# Patient Record
Sex: FEMALE | Race: BLACK OR AFRICAN AMERICAN | Employment: FULL TIME | URBAN - METROPOLITAN AREA
[De-identification: names, ages, dates, MRNs, and addresses within clinical notes are randomized per-mention and may not be internally consistent; named-entity substitution may affect disease eponyms.]

---

## 2022-05-06 ENCOUNTER — OFFICE VISIT (OUTPATIENT)
Dept: PRIMARY CARE CLINIC | Age: 32
End: 2022-05-06
Payer: COMMERCIAL

## 2022-05-06 ENCOUNTER — TELEPHONE (OUTPATIENT)
Dept: SURGERY | Age: 32
End: 2022-05-06

## 2022-05-06 VITALS
HEART RATE: 79 BPM | OXYGEN SATURATION: 97 % | DIASTOLIC BLOOD PRESSURE: 82 MMHG | WEIGHT: 149.4 LBS | HEIGHT: 68 IN | TEMPERATURE: 97.7 F | SYSTOLIC BLOOD PRESSURE: 123 MMHG | BODY MASS INDEX: 22.64 KG/M2

## 2022-05-06 DIAGNOSIS — L72.3 SEBACEOUS CYST: Primary | ICD-10-CM

## 2022-05-06 DIAGNOSIS — F43.21 GRIEF ASSOCIATED WITH LOSS OF FETUS: ICD-10-CM

## 2022-05-06 PROCEDURE — 99203 OFFICE O/P NEW LOW 30 MIN: CPT | Performed by: NURSE PRACTITIONER

## 2022-05-06 RX ORDER — DROSPIRENONE, ETHINYL ESTRADIOL AND LEVOMEFOLATE CALCIUM AND LEVOMEFOLATE CALCIUM 3-0.02(24)
KIT ORAL
COMMUNITY
Start: 2022-04-27

## 2022-05-06 RX ORDER — MULTIVITAMIN
TABLET ORAL
COMMUNITY

## 2022-05-06 SDOH — ECONOMIC STABILITY: FOOD INSECURITY: WITHIN THE PAST 12 MONTHS, YOU WORRIED THAT YOUR FOOD WOULD RUN OUT BEFORE YOU GOT MONEY TO BUY MORE.: NEVER TRUE

## 2022-05-06 SDOH — ECONOMIC STABILITY: FOOD INSECURITY: WITHIN THE PAST 12 MONTHS, THE FOOD YOU BOUGHT JUST DIDN'T LAST AND YOU DIDN'T HAVE MONEY TO GET MORE.: NEVER TRUE

## 2022-05-06 ASSESSMENT — PATIENT HEALTH QUESTIONNAIRE - PHQ9
SUM OF ALL RESPONSES TO PHQ9 QUESTIONS 1 & 2: 3
SUM OF ALL RESPONSES TO PHQ QUESTIONS 1-9: 5
3. TROUBLE FALLING OR STAYING ASLEEP: 1
4. FEELING TIRED OR HAVING LITTLE ENERGY: 1
SUM OF ALL RESPONSES TO PHQ QUESTIONS 1-9: 5
8. MOVING OR SPEAKING SO SLOWLY THAT OTHER PEOPLE COULD HAVE NOTICED. OR THE OPPOSITE, BEING SO FIGETY OR RESTLESS THAT YOU HAVE BEEN MOVING AROUND A LOT MORE THAN USUAL: 0
7. TROUBLE CONCENTRATING ON THINGS, SUCH AS READING THE NEWSPAPER OR WATCHING TELEVISION: 0
5. POOR APPETITE OR OVEREATING: 0
10. IF YOU CHECKED OFF ANY PROBLEMS, HOW DIFFICULT HAVE THESE PROBLEMS MADE IT FOR YOU TO DO YOUR WORK, TAKE CARE OF THINGS AT HOME, OR GET ALONG WITH OTHER PEOPLE: 0
SUM OF ALL RESPONSES TO PHQ QUESTIONS 1-9: 5
9. THOUGHTS THAT YOU WOULD BE BETTER OFF DEAD, OR OF HURTING YOURSELF: 0
2. FEELING DOWN, DEPRESSED OR HOPELESS: 2
6. FEELING BAD ABOUT YOURSELF - OR THAT YOU ARE A FAILURE OR HAVE LET YOURSELF OR YOUR FAMILY DOWN: 0
1. LITTLE INTEREST OR PLEASURE IN DOING THINGS: 1
SUM OF ALL RESPONSES TO PHQ QUESTIONS 1-9: 5

## 2022-05-06 ASSESSMENT — SOCIAL DETERMINANTS OF HEALTH (SDOH): HOW HARD IS IT FOR YOU TO PAY FOR THE VERY BASICS LIKE FOOD, HOUSING, MEDICAL CARE, AND HEATING?: NOT HARD AT ALL

## 2022-05-06 NOTE — PROGRESS NOTES
[facility]  [id number] [facility] [address]  Dept: [phone]  Dept Fax: [phone]    Patient Care Team:  MINESH Angeles CNP as PCP - General (Family Medicine)    2022    Tangela García (:  1990) raman 33 y.o. female, here for evaluation of the following medical concerns:   Chief Complaint   Patient presents with    New Patient     Est.Care    Mass     RL back, onset \"many years         Tangela García is here to establish care. The patient denies any pertinent medical history    She follows currently with [address] [facility]ne[facility] for her women's health needs. Itzel Miller is asking for assistance with 2 things today. Concerned about a growth on her right lower back. States she has had a lump that is been present for a few years, and lately it has been growing. It sits near her waistband is often irritated or has pressure applied to the area. It occasionally becomes inflamed. She is also asking for options for treatment for anxiety and depression. Itzel Miller admits that she had a tubal pregnancy last year, and this past January she had to make the decision to have an  due to hyperemesis. She voices that she had to make the decision between the pregnancy and her own health, and has been struggling with feeling down and depressed. She is often tearful with no known trigger. Itzel Miller admits to often wondering \"what if\" and admits her sleep has been restless. Review of Systems   Constitutional: Negative for chills and fever. Skin: Negative for rash and wound. Psychiatric/Behavioral: Positive for dysphoric mood and sleep disturbance. Negative for decreased concentration, self-injury and suicidal ideas. The patient is nervous/anxious. Prior to Visit Medications    Medication Sig Taking?  Authorizing Provider   Drospiren-Eth Estrad-Levomefol 3-0.02-0.451 MG TABS  Yes Historical Provider, MD   Multiple Vitamin (MULTI-VITAMIN DAILY) TABS Take by mouth Yes Historical Provider, MD        No Known Allergies    History reviewed. No pertinent past medical history. Past Surgical History:   Procedure Laterality Date    BREAST SURGERY       SECTION         Social History     Socioeconomic History    Marital status: Unknown     Spouse name: Not on file    Number of children: Not on file    Years of education: Not on file    Highest education level: Not on file   Occupational History    Not on file   Tobacco Use    Smoking status: Never Smoker    Smokeless tobacco: Never Used   Vaping Use    Vaping Use: Never used   Substance and Sexual Activity    Alcohol use: Yes     Comment: occ    Drug use: Never    Sexual activity: Yes   Other Topics Concern    Not on file   Social History Narrative    Not on file     Social Determinants of Health     Financial Resource Strain: Low Risk     Difficulty of Paying Living Expenses: Not hard at all   Food Insecurity: No Food Insecurity    Worried About Running Out of Food in the Last Year: Never true    [address] in the Last Year: Never true   Transportation Needs:     Lack of Transportation (Medical): Not on file    Lack of Transportation (Non-Medical):  Not on file   Physical Activity:     Days of Exercise per Week: Not on file    Minutes of Exercise per Session: Not on file   Stress:     Feeling of Stress : Not on file   Social Connections:     Frequency of Communication with Friends and Family: Not on file    Frequency of Social Gatherings with Friends and Family: Not on file    Attends Restorationist Services: Not on file    Active Member of Clubs or Organizations: Not on file    Attends Club or Organization Meetings: Not on file    Marital Status: Not on file   Intimate Partner Violence:     Fear of Current or Ex-Partner: Not on file    Emotionally Abused: Not on file    Physically Abused: Not on file   [employer] Sexually Abused: Not on file   Housing Stability:     Unable to Pay for Housing in the Last Year: Not on file    Number of Places Lived in the Last Year: Not on file    Unstable Housing in the Last Year: Not on file        Family History   Family history unknown: Yes       Vitals:    05/06/22 1000 05/06/22 1003   BP: (!) 140/90 123/82   Site: Right Upper Arm Left Upper Arm   Position: Sitting Sitting   Pulse: 79 79   Temp: 97.7 °F (36.5 °C)    TempSrc: Temporal    SpO2: 97%    Weight: 149 lb 6.4 oz (67.8 kg)    Height: 5' 8\" (1.727 m)      Estimated body mass index is 22.72 kg/m² as calculated from the following:    Height as of this encounter: 5' 8\" (1.727 m). Weight as of this encounter: 149 lb 6.4 oz (67.8 kg). Physical Exam  Vitals and nursing note reviewed. Constitutional:       General: She is not in acute distress. Appearance: She is well-developed. HENT:      Head: Normocephalic. Eyes:      General: No scleral icterus. Pupils: Pupils are equal, round, and reactive to light. Cardiovascular:      Rate and Rhythm: Normal rate and regular rhythm. Pulmonary:      Effort: Pulmonary effort is normal.      Breath sounds: Normal breath sounds. Abdominal:      Palpations: Abdomen is soft. Musculoskeletal:      Cervical back: Normal range of motion and neck supple. Skin:     General: Skin is warm and dry. Findings: No abscess, signs of injury or wound. Neurological:      Mental Status: She is alert and oriented to person, place, and time. Coordination: Coordination normal.   Psychiatric:         Behavior: Behavior normal. Behavior is cooperative. Thought Content: Thought content normal.         Judgment: Judgment normal.         ASSESSMENT     Diagnosis Orders   1. Sebaceous cyst  Dave Law DO, General Surgery, [place]   2.  Grief associated with loss of fetus  [address]:  No orders of the defined types were placed in this encounter. 1. Sebaceous cyst versus blackhead. Due to location and growing size, patient does wish for it to be removed as it is often rubbed and irritated. General surgery referral placed today  2. Discussed treatment options for anxiety and depression. Patient does not wish to start medication at this time. Agreed to referral to trauma counseling and recovery. Close follow-up in 2 months    FOLLOW UP AND INSTRUCTIONS:  Return in about 2 months (around 7/6/2022) for Depression, Anxiety. · Discussed use, benefit, and side effects of prescribed medications. Barriers to  medication compliance addressed. All patient questions answered. Pt voiced  understanding. · Patient given educational materials - see patient instructions    · Patient advised to contact scheduling offices for any referrals or imaging orders  placed today if they have not been contacted in 48 hours. Return in about 2 months (around 7/6/2022) for Depression, Anxiety. An  electronic signature was used to authenticate this note. --MINESH Elizalde CNP on 5/6/2022 at 10:56 AM  Visit Information    Have you changed or started any medications since your last visit including any over-the-counter medicines, vitamins, or herbal medicines? no   Are you having any side effects from any of your medications? -  no  Have you stopped taking any of your medications? Is so, why? -  no    Have you seen any other physician or provider since your last visit? Yes - Records Obtained  Have you had any other diagnostic tests since your last visit? No  Have you been seen in the emergency room and/or had an admission to a hospital since we last saw you? No  Have you had your routine dental cleaning in the past 6 months? no    Have you activated your [employer] account? If not, what are your barriers?  No: Declined     Patient Care Team:  MINESH Elizalde CNP as PCP - General (Family Medicine)    Medical History Review  Past Medical, Family, and Social History reviewed and does not contribute to the patient presenting condition    Health Maintenance   Topic Date Due    Varicella vaccine (1 of 2 - 2-dose childhood series) Never done    COVID-19 Vaccine (1) Never done    Depression Screen  Never done    HIV screen  Never done    Hepatitis C screen  Never done    DTaP/Tdap/Td vaccine (1 - Tdap) Never done    Cervical cancer screen  Never done    Flu vaccine (Season Ended) 09/01/2022    Hepatitis A vaccine  Aged Out    Hepatitis B vaccine  Aged Out    Hib vaccine  Aged Out    Meningococcal (ACWY) vaccine  Aged Out    Pneumococcal 0-64 years Vaccine  Aged Out monthly or less

## 2022-05-09 ENCOUNTER — TELEPHONE (OUTPATIENT)
Dept: SURGERY | Age: 32
End: 2022-05-09

## 2022-05-11 ENCOUNTER — TELEPHONE (OUTPATIENT)
Dept: SURGERY | Age: 32
End: 2022-05-11

## 2022-05-19 ENCOUNTER — TELEPHONE (OUTPATIENT)
Dept: PRIMARY CARE CLINIC | Age: 32
End: 2022-05-19

## 2022-05-19 NOTE — TELEPHONE ENCOUNTER
[address]  supervisor of clinicians called and stated that they do not have a female clinician available for the next 2-3 weeks for the patient and she believes pt needs a female clinician.   She is at ext [phone]

## 2022-05-19 NOTE — TELEPHONE ENCOUNTER
Attempted to contact at extension provided a message, voicemail left. Provider is unclear as to the request plan from trauma counseling at this point.

## 2022-08-30 ENCOUNTER — TELEPHONE (OUTPATIENT)
Dept: SURGERY | Age: 32
End: 2022-08-30

## 2022-08-30 ENCOUNTER — OFFICE VISIT (OUTPATIENT)
Dept: SURGERY | Age: 32
End: 2022-08-30
Payer: COMMERCIAL

## 2022-08-30 VITALS
WEIGHT: 149 LBS | DIASTOLIC BLOOD PRESSURE: 80 MMHG | BODY MASS INDEX: 22.58 KG/M2 | OXYGEN SATURATION: 100 % | SYSTOLIC BLOOD PRESSURE: 139 MMHG | HEIGHT: 68 IN | RESPIRATION RATE: 16 BRPM | HEART RATE: 85 BPM

## 2022-08-30 DIAGNOSIS — R19.00 RIGHT FLANK MASS: Primary | ICD-10-CM

## 2022-08-30 PROCEDURE — G8420 CALC BMI NORM PARAMETERS: HCPCS | Performed by: SURGERY

## 2022-08-30 PROCEDURE — G8427 DOCREV CUR MEDS BY ELIG CLIN: HCPCS | Performed by: SURGERY

## 2022-08-30 PROCEDURE — 1036F TOBACCO NON-USER: CPT | Performed by: SURGERY

## 2022-08-30 PROCEDURE — 99203 OFFICE O/P NEW LOW 30 MIN: CPT | Performed by: SURGERY

## 2022-08-30 NOTE — LETTER
[facility]  Surgical Specialties - General Surgery  [address]  [place], [address]  Phone: [phone]  Fax: [phone]      22    Patient: Chrystal Hughes  MRN: [id number]  : 1990  Date of visit: 2022    Dear MINESH Ashraf - CNP:      Thank you for requesting consultation for Chrystal Hughes in regards to evaluation for right flank mass. Below are the relevant portions of my assessment and plan of care. If you have questions, please do not hesitate to call me. I look forward to following Chrystal Hughes along with you.     Sincerely,        Efrem Mcgowan, DO

## 2022-08-30 NOTE — TELEPHONE ENCOUNTER
8/30/22- Spoke to patient, surgery scheduled at [facility], patient confirmed and information given to patient at clinic visit.      Surgery date/time: 9/23/22 at 12pm  Arrival time: 10am  PAT: phone call  Oksana Asher op: 10/10/22 at 1:10pm

## 2022-08-31 NOTE — PROGRESS NOTES
[id number] [facility] Surgery  History & Physical  Oleta Annmarie, DO    Pt Name: Celeste Muñoz  MRN: [id number]  YOB: 1990  Date of evaluation: 2022  Primary Care Physician: MINESH Kaba - RUSLAN    Chief Complaint: Right flank subcutaneous mass      SUBJECTIVE:    History of Present Illness: This is a 33 y.o.  female who presents for evaluation for the above, patient reports this subcutaneous mass has been present for some time but becoming more bothersome particularly when bumped. Denies any history of attempts at excision, no history of bleeding or infection or drainage. Chart review performed to add information to the HPI: Yes    Past Medical History   has no past medical history on file. Past Surgical History   has a past surgical history that includes  section and Breast surgery. Family History  Family history is unknown by patient. Social History  Tobacco use:  reports that she has never smoked. She has never used smokeless tobacco.  Alcohol use:  reports current alcohol use. Drug use:  reports no history of drug use. Medications  Current Medications:   Current Outpatient Medications   Medication Sig Dispense Refill    Drospiren-Eth Estrad-Levomefol 3-0.02-0.451 MG TABS       Multiple Vitamin (MULTI-VITAMIN DAILY) TABS Take by mouth       No current facility-administered medications for this visit. Home Medications:   Prior to Admission medications    Medication Sig Start Date End Date Taking? Authorizing Provider   Drospiren-Eth Estrad-Levomefol 3-0.02-0.451 MG TABS  22  Yes Historical Provider, MD   Multiple Vitamin (MULTI-VITAMIN DAILY) TABS Take by mouth   Yes Historical Provider, MD       Allergies  Patient has no known allergies. Review of Systems:  General: Denies any fever, chills.   Eyes: Denies any changes in vision, diplopia or eye pain  Ears, Nose, Mouth: Denies changes in hearing/tinnitus or drainage from ears, no rhinorrhea or bloody nose, no difficulty chewing  Throat: no difficulty swallowing, no throat pain  Respiratory: Denies any shortness of breath or cough. Cardiac: Denies any chest pain, palpitations, claudication or edema. Gastrointestinal: Denies any melena, hematochezia, hematemesis or pyrosis. Genitourinary: Denies any frequency, urgency, hesitancy or incontinence. Musculoskeletal: Denies worsening muscle weakness or recent trauma  Skin: Subcutaneous mass right flank  Psychiatric: Denies any recent changes in mood or affect  Hematologic: Denies bruising or bleeding easily. PHYSICAL EXAMINATION  Vitals:   Vitals:    08/30/22 1343   BP: 139/80   Pulse: 85   Resp: 16   SpO2: 100%       General Appearance:  awake, alert, no acute distress, well developed, well nourished   Skin: Well-circumscribed subcutaneous mass of the right flank just above the level of the right iliac crest, normal overlying skin, approximately 1 cm with smooth borders, no bleeding or infection noted  Head/face:  NCAT, face symmetrical  Eyes:  PERRL, no evidence of conjunctivitis or ptosis bilaterally  Ears:  External ears and canals grossly normal, no evidence of otorrhea. Nose/Sinuses:  Nares normal. Septum midline. Mucosa normal. No external drainage noted. Mouth/Neck:  Mucosa moist.  No external oral lesions. Trachea midline. No visible masses. Lungs:  Normal chest expansion, unlabored breathing without accessory muscle use. No audible rales, rhonchi, or wheezing. Cardiovascular: S1S2. No evidence of JVD. No evidence of pulsatile masses in abdomen  Abdomen:  Soft, non-tender, no organomegaly, no masses. Musculoskeletal: No evidence of bony/muscular deformities, trauma, atrophy of either left/right upper/lower extremity. No evidence of digital clubbing or cyanosis. Neurologic:  CN 2-12 grossly intact without obvious deficits. Grossly normal sensation in all extremities.   Psychiatric: appropriate judgement and insight, appropriate recall of recent and remote memory, no evidence of depression/anxiety/agitation        DIAGNOSES:   Diagnosis Orders   1. Right flank mass            PLAN:  At this time does not appear to be grossly consistent with epidermoid cyst, pt would like to proceed with excision in the operating room. The risks include bleeding, infection, scarring, damage to surrounding tissues, risk of recurrence, need for further surgery in the future. The benefits, alternatives, complications and procedure details were explained to the pt, all questions were answered.         Medical Decision Making: low complexity    Electronically signed by Mounika Jackson DO on 8/31/2022 at 10:48 AM

## 2022-09-20 ENCOUNTER — TELEPHONE (OUTPATIENT)
Dept: SURGERY | Age: 32
End: 2022-09-20

## 2022-09-20 NOTE — TELEPHONE ENCOUNTER
9/20/22- Patient called and states she needs to reschedule surgery with Dr. Radha Cortés. States she started a new job and cannot take time off at this time. Patient will call back to reschedule.

## 2022-09-27 ENCOUNTER — TELEPHONE (OUTPATIENT)
Dept: SURGERY | Age: 32
End: 2022-09-27

## 2022-09-27 NOTE — TELEPHONE ENCOUNTER
9/27/22- Spoke to patient, surgery scheduled at [facility], patient confirmed and information given to patient at clinic visit.       Surgery date/time: 10/7/22 at 1:15pm  Arrival time: 11:15am  PAT: phone call  Post op: 10/24/22 at 1:40pm

## 2022-10-06 ENCOUNTER — ANESTHESIA EVENT (OUTPATIENT)
Dept: OPERATING ROOM | Age: 32
End: 2022-10-06
Payer: COMMERCIAL

## 2022-10-07 ENCOUNTER — ANESTHESIA (OUTPATIENT)
Dept: OPERATING ROOM | Age: 32
End: 2022-10-07
Payer: COMMERCIAL

## 2022-10-07 ENCOUNTER — HOSPITAL ENCOUNTER (OUTPATIENT)
Age: 32
Setting detail: OUTPATIENT SURGERY
Discharge: HOME | End: 2022-10-07
Attending: SURGERY | Admitting: SURGERY
Payer: COMMERCIAL

## 2022-10-07 VITALS
SYSTOLIC BLOOD PRESSURE: 140 MMHG | WEIGHT: 146 LBS | OXYGEN SATURATION: 100 % | HEIGHT: 68 IN | RESPIRATION RATE: 16 BRPM | TEMPERATURE: 96.4 F | BODY MASS INDEX: 22.13 KG/M2 | HEART RATE: 71 BPM | DIASTOLIC BLOOD PRESSURE: 90 MMHG

## 2022-10-07 DIAGNOSIS — R19.00 RIGHT FLANK MASS: Primary | ICD-10-CM

## 2022-10-07 LAB — HCG, PREGNANCY URINE (POC): NEGATIVE

## 2022-10-07 PROCEDURE — 3700000000 HC ANESTHESIA ATTENDED CARE: Performed by: SURGERY

## 2022-10-07 PROCEDURE — 7100000001 HC PACU RECOVERY - ADDTL 15 MIN: Performed by: SURGERY

## 2022-10-07 PROCEDURE — 2500000003 HC RX 250 WO HCPCS: Performed by: SURGERY

## 2022-10-07 PROCEDURE — 3700000001 HC ADD 15 MINUTES (ANESTHESIA): Performed by: SURGERY

## 2022-10-07 PROCEDURE — 2500000003 HC RX 250 WO HCPCS: Performed by: NURSE ANESTHETIST, CERTIFIED REGISTERED

## 2022-10-07 PROCEDURE — 21930 EXC BACK LES SC < 3 CM: CPT | Performed by: SURGERY

## 2022-10-07 PROCEDURE — 3600000012 HC SURGERY LEVEL 2 ADDTL 15MIN: Performed by: SURGERY

## 2022-10-07 PROCEDURE — 2580000003 HC RX 258: Performed by: ANESTHESIOLOGY

## 2022-10-07 PROCEDURE — 7100000000 HC PACU RECOVERY - FIRST 15 MIN: Performed by: SURGERY

## 2022-10-07 PROCEDURE — 88304 TISSUE EXAM BY PATHOLOGIST: CPT

## 2022-10-07 PROCEDURE — 6360000002 HC RX W HCPCS: Performed by: SURGERY

## 2022-10-07 PROCEDURE — 81025 URINE PREGNANCY TEST: CPT

## 2022-10-07 PROCEDURE — 6360000002 HC RX W HCPCS: Performed by: NURSE ANESTHETIST, CERTIFIED REGISTERED

## 2022-10-07 PROCEDURE — 7100000010 HC PHASE II RECOVERY - FIRST 15 MIN: Performed by: SURGERY

## 2022-10-07 PROCEDURE — 6370000000 HC RX 637 (ALT 250 FOR IP): Performed by: SURGERY

## 2022-10-07 PROCEDURE — 3600000002 HC SURGERY LEVEL 2 BASE: Performed by: SURGERY

## 2022-10-07 PROCEDURE — 2709999900 HC NON-CHARGEABLE SUPPLY: Performed by: SURGERY

## 2022-10-07 PROCEDURE — 2580000003 HC RX 258: Performed by: SURGERY

## 2022-10-07 PROCEDURE — 7100000011 HC PHASE II RECOVERY - ADDTL 15 MIN: Performed by: SURGERY

## 2022-10-07 RX ORDER — MORPHINE SULFATE 2 MG/ML
1 INJECTION, SOLUTION INTRAMUSCULAR; INTRAVENOUS EVERY 5 MIN PRN
Status: DISCONTINUED | OUTPATIENT
Start: 2022-10-07 | End: 2022-10-07 | Stop reason: HOSPADM

## 2022-10-07 RX ORDER — LIDOCAINE HYDROCHLORIDE 10 MG/ML
INJECTION, SOLUTION INFILTRATION; PERINEURAL
Status: DISCONTINUED
Start: 2022-10-07 | End: 2022-10-07 | Stop reason: HOSPADM

## 2022-10-07 RX ORDER — DIPHENHYDRAMINE HYDROCHLORIDE 50 MG/ML
12.5 INJECTION INTRAMUSCULAR; INTRAVENOUS
Status: DISCONTINUED | OUTPATIENT
Start: 2022-10-07 | End: 2022-10-07 | Stop reason: HOSPADM

## 2022-10-07 RX ORDER — CEPHALEXIN 500 MG/1
500 CAPSULE ORAL 2 TIMES DAILY
Qty: 6 CAPSULE | Refills: 0 | Status: SHIPPED | OUTPATIENT
Start: 2022-10-07 | End: 2022-10-10

## 2022-10-07 RX ORDER — HYDROCODONE BITARTRATE AND ACETAMINOPHEN 5; 325 MG/1; MG/1
1 TABLET ORAL EVERY 6 HOURS PRN
Qty: 10 TABLET | Refills: 0 | Status: SHIPPED | OUTPATIENT
Start: 2022-10-07 | End: 2022-10-14

## 2022-10-07 RX ORDER — DEXAMETHASONE SODIUM PHOSPHATE 10 MG/ML
INJECTION, SOLUTION INTRAMUSCULAR; INTRAVENOUS PRN
Status: DISCONTINUED | OUTPATIENT
Start: 2022-10-07 | End: 2022-10-07 | Stop reason: SDUPTHER

## 2022-10-07 RX ORDER — SODIUM CHLORIDE, SODIUM LACTATE, POTASSIUM CHLORIDE, CALCIUM CHLORIDE 600; 310; 30; 20 MG/100ML; MG/100ML; MG/100ML; MG/100ML
INJECTION, SOLUTION INTRAVENOUS CONTINUOUS
Status: DISCONTINUED | OUTPATIENT
Start: 2022-10-07 | End: 2022-10-07 | Stop reason: HOSPADM

## 2022-10-07 RX ORDER — SODIUM CHLORIDE 9 MG/ML
25 INJECTION, SOLUTION INTRAVENOUS PRN
Status: DISCONTINUED | OUTPATIENT
Start: 2022-10-07 | End: 2022-10-07 | Stop reason: HOSPADM

## 2022-10-07 RX ORDER — BUPIVACAINE HYDROCHLORIDE 2.5 MG/ML
INJECTION, SOLUTION EPIDURAL; INFILTRATION; INTRACAUDAL
Status: DISCONTINUED
Start: 2022-10-07 | End: 2022-10-07 | Stop reason: HOSPADM

## 2022-10-07 RX ORDER — MIDAZOLAM HYDROCHLORIDE 1 MG/ML
INJECTION INTRAMUSCULAR; INTRAVENOUS PRN
Status: DISCONTINUED | OUTPATIENT
Start: 2022-10-07 | End: 2022-10-07 | Stop reason: SDUPTHER

## 2022-10-07 RX ORDER — PROPOFOL 10 MG/ML
INJECTION, EMULSION INTRAVENOUS PRN
Status: DISCONTINUED | OUTPATIENT
Start: 2022-10-07 | End: 2022-10-07 | Stop reason: SDUPTHER

## 2022-10-07 RX ORDER — SODIUM CHLORIDE 0.9 % (FLUSH) 0.9 %
5-40 SYRINGE (ML) INJECTION EVERY 12 HOURS SCHEDULED
Status: DISCONTINUED | OUTPATIENT
Start: 2022-10-07 | End: 2022-10-07 | Stop reason: SDUPTHER

## 2022-10-07 RX ORDER — CEFAZOLIN SODIUM 2 G/50ML
SOLUTION INTRAVENOUS PRN
Status: DISCONTINUED | OUTPATIENT
Start: 2022-10-07 | End: 2022-10-07 | Stop reason: SDUPTHER

## 2022-10-07 RX ORDER — LIDOCAINE HYDROCHLORIDE 10 MG/ML
1 INJECTION, SOLUTION INFILTRATION; PERINEURAL
Status: DISCONTINUED | OUTPATIENT
Start: 2022-10-07 | End: 2022-10-07 | Stop reason: HOSPADM

## 2022-10-07 RX ORDER — NEOSTIGMINE METHYLSULFATE 5 MG/5 ML
SYRINGE (ML) INTRAVENOUS PRN
Status: DISCONTINUED | OUTPATIENT
Start: 2022-10-07 | End: 2022-10-07 | Stop reason: SDUPTHER

## 2022-10-07 RX ORDER — ONDANSETRON 2 MG/ML
INJECTION INTRAMUSCULAR; INTRAVENOUS PRN
Status: DISCONTINUED | OUTPATIENT
Start: 2022-10-07 | End: 2022-10-07 | Stop reason: SDUPTHER

## 2022-10-07 RX ORDER — SODIUM CHLORIDE 0.9 % (FLUSH) 0.9 %
5-40 SYRINGE (ML) INJECTION PRN
Status: DISCONTINUED | OUTPATIENT
Start: 2022-10-07 | End: 2022-10-07 | Stop reason: SDUPTHER

## 2022-10-07 RX ORDER — ONDANSETRON 2 MG/ML
4 INJECTION INTRAMUSCULAR; INTRAVENOUS
Status: DISCONTINUED | OUTPATIENT
Start: 2022-10-07 | End: 2022-10-07 | Stop reason: HOSPADM

## 2022-10-07 RX ORDER — ACETAMINOPHEN 160 MG
TABLET,DISINTEGRATING ORAL PRN
Status: DISCONTINUED | OUTPATIENT
Start: 2022-10-07 | End: 2022-10-07 | Stop reason: ALTCHOICE

## 2022-10-07 RX ORDER — ROCURONIUM BROMIDE 10 MG/ML
INJECTION, SOLUTION INTRAVENOUS PRN
Status: DISCONTINUED | OUTPATIENT
Start: 2022-10-07 | End: 2022-10-07 | Stop reason: SDUPTHER

## 2022-10-07 RX ORDER — MEPERIDINE HYDROCHLORIDE 50 MG/ML
12.5 INJECTION INTRAMUSCULAR; INTRAVENOUS; SUBCUTANEOUS ONCE
Status: DISCONTINUED | OUTPATIENT
Start: 2022-10-07 | End: 2022-10-07 | Stop reason: HOSPADM

## 2022-10-07 RX ORDER — SODIUM CHLORIDE 0.9 % (FLUSH) 0.9 %
5-40 SYRINGE (ML) INJECTION EVERY 12 HOURS SCHEDULED
Status: DISCONTINUED | OUTPATIENT
Start: 2022-10-07 | End: 2022-10-07 | Stop reason: HOSPADM

## 2022-10-07 RX ORDER — SODIUM CHLORIDE 0.9 % (FLUSH) 0.9 %
5-40 SYRINGE (ML) INJECTION PRN
Status: DISCONTINUED | OUTPATIENT
Start: 2022-10-07 | End: 2022-10-07 | Stop reason: HOSPADM

## 2022-10-07 RX ORDER — FENTANYL CITRATE 50 UG/ML
INJECTION, SOLUTION INTRAMUSCULAR; INTRAVENOUS PRN
Status: DISCONTINUED | OUTPATIENT
Start: 2022-10-07 | End: 2022-10-07 | Stop reason: SDUPTHER

## 2022-10-07 RX ORDER — CEFAZOLIN 2 G/1
INJECTION, POWDER, FOR SOLUTION INTRAMUSCULAR; INTRAVENOUS
Status: DISCONTINUED
Start: 2022-10-07 | End: 2022-10-07 | Stop reason: HOSPADM

## 2022-10-07 RX ORDER — KETOROLAC TROMETHAMINE 30 MG/ML
INJECTION, SOLUTION INTRAMUSCULAR; INTRAVENOUS PRN
Status: DISCONTINUED | OUTPATIENT
Start: 2022-10-07 | End: 2022-10-07 | Stop reason: SDUPTHER

## 2022-10-07 RX ORDER — GLYCOPYRROLATE 0.2 MG/ML
INJECTION INTRAMUSCULAR; INTRAVENOUS PRN
Status: DISCONTINUED | OUTPATIENT
Start: 2022-10-07 | End: 2022-10-07 | Stop reason: SDUPTHER

## 2022-10-07 RX ORDER — LIDOCAINE HYDROCHLORIDE 10 MG/ML
INJECTION, SOLUTION INFILTRATION; PERINEURAL PRN
Status: DISCONTINUED | OUTPATIENT
Start: 2022-10-07 | End: 2022-10-07 | Stop reason: SDUPTHER

## 2022-10-07 RX ORDER — SODIUM CHLORIDE 9 MG/ML
INJECTION, SOLUTION INTRAVENOUS PRN
Status: DISCONTINUED | OUTPATIENT
Start: 2022-10-07 | End: 2022-10-07 | Stop reason: SDUPTHER

## 2022-10-07 RX ADMIN — DEXAMETHASONE SODIUM PHOSPHATE 5 MG: 10 INJECTION INTRAMUSCULAR; INTRAVENOUS at 13:14

## 2022-10-07 RX ADMIN — GLYCOPYRROLATE 0.2 MG: 0.2 INJECTION INTRAMUSCULAR; INTRAVENOUS at 13:41

## 2022-10-07 RX ADMIN — PROPOFOL 200 MG: 10 INJECTION, EMULSION INTRAVENOUS at 12:55

## 2022-10-07 RX ADMIN — MIDAZOLAM 2 MG: 1 INJECTION INTRAMUSCULAR; INTRAVENOUS at 12:52

## 2022-10-07 RX ADMIN — LIDOCAINE HYDROCHLORIDE 20 MG: 10 INJECTION, SOLUTION INFILTRATION; PERINEURAL at 12:55

## 2022-10-07 RX ADMIN — SODIUM CHLORIDE, POTASSIUM CHLORIDE, SODIUM LACTATE AND CALCIUM CHLORIDE: 600; 310; 30; 20 INJECTION, SOLUTION INTRAVENOUS at 11:40

## 2022-10-07 RX ADMIN — ONDANSETRON 4 MG: 2 INJECTION INTRAMUSCULAR; INTRAVENOUS at 13:14

## 2022-10-07 RX ADMIN — KETOROLAC TROMETHAMINE 30 MG: 30 INJECTION, SOLUTION INTRAMUSCULAR; INTRAVENOUS at 13:25

## 2022-10-07 RX ADMIN — GLYCOPYRROLATE 0.2 MG: 0.2 INJECTION INTRAMUSCULAR; INTRAVENOUS at 13:24

## 2022-10-07 RX ADMIN — CEFAZOLIN 2000 MG: 2 INJECTION, POWDER, FOR SOLUTION INTRAMUSCULAR; INTRAVENOUS at 13:07

## 2022-10-07 RX ADMIN — CEFAZOLIN SODIUM 2000 MG: 2 SOLUTION INTRAVENOUS at 13:00

## 2022-10-07 RX ADMIN — FENTANYL CITRATE 100 MCG: 50 INJECTION, SOLUTION INTRAMUSCULAR; INTRAVENOUS at 12:55

## 2022-10-07 RX ADMIN — ROCURONIUM BROMIDE 10 MG: 10 INJECTION INTRAVENOUS at 13:01

## 2022-10-07 RX ADMIN — Medication 1 MG: at 13:24

## 2022-10-07 ASSESSMENT — PAIN - FUNCTIONAL ASSESSMENT: PAIN_FUNCTIONAL_ASSESSMENT: 0-10

## 2022-10-07 NOTE — H&P
[address]      Patient's Name/ Date of Birth/ Gender: Kimberly Galdamez /  (33 y.o.) / female     Attending physician: Herminio Henderson DO    CC: R flank mass    History of present Illness: Kimberly Galdamez is a 33 y.o. female, presents for R flank mass excision. Past Medical History:  has no past medical history on file. Past Surgical History:   Past Surgical History:   Procedure Laterality Date    BREAST SURGERY      lumpectomy     SECTION         Social History:  reports that she has never smoked. She has never used smokeless tobacco. She reports current alcohol use. She reports that she does not use drugs. Family History: Family history is unknown by patient. Review of Systems:   General: Denies fever, chills, night sweats, weight loss, malaise, fatigue  HEENT: Denies sore throat, sinus problems, allergic rhinosinusitis  Card: Denies chest pain, palpitations, orthopnea/PND. Denies h/o murmurs  Pulm: Denies cough, shortness of breath, [place]  GI:   Denies history of constipation, diarrhea, hematochezia or melena  : Denies polyuria, dysuria, hematuria  Endo: Denies diabetes, thyroid problems. Heme: Denies anemia, h/o bleeding or clotting problems. Neuro: Denies h/o CVA, TIA  Skin: Denies rashes, ulcers  Musculoskeletal: Denies muscle, joint, back pain. Allergies: Patient has no known allergies.     Current Meds:  Current Facility-Administered Medications:     ceFAZolin (ANCEF) 2,000 mg in sodium chloride 0.9 % 50 mL IVPB (mini-bag), 2,000 mg, IntraVENous, On Call to OR, Herminio Henderson DO    lidocaine 1 % injection 1 mL, 1 mL, IntraDERmal, Once PRN, Jenny Kimble MD    lactated ringers infusion, , IntraVENous, Continuous, Danie Cabello MD    sodium chloride flush 0.9 % injection 5-40 mL, 5-40 mL, IntraVENous, 2 times per day, Jenny Kimble MD    sodium chloride flush 0.9 % injection 5-40 mL, 5-40 mL, IntraVENous, PRN, Danie Sandhya Mcneill MD    0.9 % sodium chloride infusion, , IntraVENous, PRN, Jin Chavarria MD    Vital Signs: There were no vitals filed for this visit. Physical Exam:  Vital signs and Nurse's note reviewed  Gen:  A&Ox3, NAD  HEENT: NCAT, PERRLA, EOMI, no scleral icterus, oral mucosa moist  Neck: Trachea midline without obvious masses or lesions  Chest: Symmetric rise with inhalation, no evidence of trauma  CVS: S1S2  Resp: Good bilateral air entry, no audible wheeze or rhonchi  Abd: soft, non-tender, non-distended, no hepatosplenomegaly or palpable masses  Ext: No clubbing, cyanosis, edema, peripheral pulses 2+ Rad/Fem/DP/PT  CNS: Moves all extremities, no gross focal motor deficits  Skin: No erythema or ulcerations         Assessment:    Ravinder Bautista is a 33 y.o. female with     R flank mass    Plan:     To OR for mass excision, all questions answered, written informed consent obtained      Gavino Weinberg,   10/7/2022

## 2022-10-07 NOTE — DISCHARGE INSTRUCTIONS
Patient Discharge Instructions  Discharge Date:  10/7/2022    HYGEINE: [id number] Marisela Thompson to shower 10/08/22, no soaking in a tub/pool until seen at your follow up appointment. Clean incision daily with gentle soap and water, do not use alcohol/peroxide or any harsh cleansers. DRIVING: No driving while taking narcotic medications or while in pain    ACTIVITY: No strain to incision    DIET: Resume your normal diet as advised by your PCP. MEDICATIONS: Take all medications as prescribed. Take Colace until you have your first bowel movement, continue to take if you are using narcotics for pain control    SPECIAL INSTRUCTIONS:     Follow up with Dr. Jenise Fernandez in 10-14 days, call the clinic for appointment. Call sooner if fever above 100.4 degrees Farenheit, increase in swelling or redness, thick purulent discharge or pain not controlled with medications.

## 2022-10-07 NOTE — ANESTHESIA POSTPROCEDURE EVALUATION
POST- ANESTHESIA EVALUATION       Pt Name: Naresh Maldonado  MRN: [id number]  [place]: 1990  Date of evaluation: 10/7/2022  Time:  2:45 PM      BP (!) 140/90   Pulse 71   Temp (!) 96.4 °F (35.8 °C) (Infrared)   Resp 16   Ht 5' 8\" (1.727 m)   Wt 146 lb (66.2 kg)   SpO2 100%   Breastfeeding No   BMI 22.20 kg/m²      Consciousness Level  Awake  Cardiopulmonary Status  Stable  Pain Adequately Treated YES  Nausea / Vomiting  NO  Adequate Hydration  YES  Anesthesia Related Complications NONE      Electronically signed by Jay Samuels MD on 10/7/2022 at 2:45 PM       Department of Anesthesiology  Postprocedure Note    Patient: Naresh Maldonado  MRN: [id number]  YOB: 1990  Date of evaluation: 10/7/2022      Procedure Summary     Date: 10/07/22 Room / Location: [address]    Anesthesia Start: 3888 Anesthesia Stop: 2284    Procedure: RIGHT FLANK MASS EXCISION (Right: Flank) Diagnosis:       Right flank mass      (Right flank mass [R19.00])    Surgeons: Isra Hatch DO Responsible Provider: Jay Samuels MD    Anesthesia Type: MAC, general ASA Status: 1          Anesthesia Type: No value filed.     Willie Phase I: Willie Score: 9    Willie Phase II: Willie Score: 10      Anesthesia Post Evaluation

## 2022-10-07 NOTE — OP NOTE
Operative Note      Patient: Ricco Telles  YOB: 1990  MRN: [id number]    Date of Procedure: 10/7/2022    Pre-Op Diagnosis: Right flank mass [R19.00]    Post-Op Diagnosis: Excision of epidermoid cyst       Procedure(s):  RIGHT FLANK MASS EXCISION 1cm    Surgeon(s):  Simi Felipe DO    Assistant:   * No surgical staff found *    Anesthesia: Monitor Anesthesia Care    Estimated Blood Loss (mL): Minimal    Complications: None    Specimens:   ID Type Source Tests Collected by Time Destination   A : RIGHT FLANK CYST Tissue Tissue SURGICAL PATHOLOGY Simi Felipe DO 10/7/2022 1319        Implants:  * No implants in log *      Drains: * No LDAs found *    Findings: as above. Wound class 2    Detailed Description of Procedure:       HISTORY: The patient is a 33y.o. year old female with history of above preop diagnosis. The risk, benefits, expected outcome, and alternatives to the procedure were explained to the patient's understanding and written informed consent was obtained. OPERATIVE DETAIL:  The patient was brought to the operating suite, was transferred to the operating table in supine position. Timeout was performed verifying correct patient, position, equipment and procedure to be performed. EPC cuffs were applied. Preoperative antibiotics were infused. General anesthesia was administered. LMA was used for airway management. The right flank was prepped and draped in the usual sterile fashion. Oblique elliptical incision was made, dissection was carried down around the mass which was excised in its entirety, grossly consistent with epidermoid cyst, overall size 1cm. Cavity irrigated with hydrogen peroxide. The wound was closed with deep dermal 3-0 vicryl sutures then 4-0 monocryl in subcuticular fashion. The incision was then covered with skin glue and steri-strips then sterile dressings. All sponge needle and instrument counts were correct at the end of the case.  The patient tolerated the procedure well without complications. She  was successfully extubated and taken to PACU for further recovery.       Electronically signed by Mona Powell DO on 10/7/2022 at 1:32 PM

## 2022-10-10 LAB — SURGICAL PATHOLOGY REPORT: NORMAL

## 2022-10-24 ENCOUNTER — OFFICE VISIT (OUTPATIENT)
Dept: SURGERY | Age: 32
End: 2022-10-24

## 2022-10-24 VITALS
BODY MASS INDEX: 22.2 KG/M2 | HEART RATE: 85 BPM | HEIGHT: 68 IN | DIASTOLIC BLOOD PRESSURE: 92 MMHG | SYSTOLIC BLOOD PRESSURE: 134 MMHG

## 2022-10-24 DIAGNOSIS — L72.0 EPIDERMOID CYST OF SKIN OF BACK: Primary | ICD-10-CM

## 2022-10-24 PROCEDURE — 99024 POSTOP FOLLOW-UP VISIT: CPT | Performed by: SURGERY

## 2022-10-25 NOTE — PROGRESS NOTES
[id number] [address] [facility] Surgery   History & Physical  Shad Cerna DO    PATIENT NAME: Ada Luis VISIT DATE: 10/24/2022    SUBJECTIVE:  Mikel Pablo is a 33 y.o. female who presents to the clinic today for follow up to excision of an epidermoid cyst of the R flank performed 10/7/22. No new issues since surgery, pt is tolerating regular diet and having normal BM. Pathology as follows:    -- Diagnosis --   SKIN, RIGHT FLANK, EXCISION:        -  EPIDERMOID CYST.       OBJECTIVE:    BP (!) 134/92 (Site: Left Upper Arm, Position: Sitting, Cuff Size: Medium Adult)   Pulse 85   Ht 5' 8\" (1.727 m)   BMI 22.20 kg/m²     General Appearance:  awake, alert, no acute distress, well developed, well nourished   Skin:  incision overall well healed, there is some minor separation of the epidermis but underlying dermis appears to be intact, no evidence of bleeding/infection  Lungs:  Normal chest expansion, unlabored breathing without accessory muscle use. No audible rales, rhonchi, or wheezing. Cardiovascular: S1S2. No evidence of JVD. No evidence of pulsatile masses in abdomen  Abdomen:  Soft, non-tender, no organomegaly, no masses. Incisions C/D/I without evidence of bleeding/infection  Musculoskeletal: No evidence of bony/muscular deformities, trauma, atrophy of either left/right upper/lower extremity. No evidence of digital clubbing or cyanosis.       ASSESSMENT:  1. Epidermoid cyst of skin of back          PLAN:  Cover incision as needed  Return to work letter provided  F/U prn    Electronically signed by Sahd Cerna DO on 10/25/2022 at 9:05 AM

## (undated) DEVICE — MHPB GEN MINOR PACK: Brand: MEDLINE INDUSTRIES, INC.

## (undated) DEVICE — GAUZE,SPONGE,2"X2",8PLY,STERILE,LF,2'S: Brand: MEDLINE

## (undated) DEVICE — SUTURE VCRL SZ 4-0 L18IN ABSRB UD L19MM PS-2 3/8 CIR PRIM J496H

## (undated) DEVICE — DRAPE,LAP,CHOLE,W/TROUGHS,STERILE: Brand: MEDLINE

## (undated) DEVICE — APPLICATOR MEDICATED 26 CC SOLUTION HI LT ORNG CHLORAPREP

## (undated) DEVICE — STRIP,CLOSURE,WOUND,MEDI-STRIP,1/2X4: Brand: MEDLINE

## (undated) DEVICE — SOLUTION IV IRRIG POUR BRL 0.9% SODIUM CHL 2F7124